# Patient Record
Sex: FEMALE | Race: WHITE | Employment: OTHER | URBAN - METROPOLITAN AREA
[De-identification: names, ages, dates, MRNs, and addresses within clinical notes are randomized per-mention and may not be internally consistent; named-entity substitution may affect disease eponyms.]

---

## 2019-09-05 ENCOUNTER — APPOINTMENT (OUTPATIENT)
Dept: PHYSICAL THERAPY | Age: 69
End: 2019-09-05

## 2019-09-12 ENCOUNTER — HOSPITAL ENCOUNTER (OUTPATIENT)
Dept: PHYSICAL THERAPY | Age: 69
Discharge: HOME | End: 2019-09-12
Payer: MEDICARE

## 2019-09-12 PROCEDURE — 97162 PT EVAL MOD COMPLEX 30 MIN: CPT

## 2019-09-12 NOTE — PROGRESS NOTES
[facility] PHYSICAL THERAPY  [address] [place] [address] 51, [facility] 201,[facility], 70 [facility] - Phone: [phone]  Fax: [phone] / [facility]  Patient Name: Florence Spears : 1950   Medical   Diagnosis: Neck Pain Treatment Diagnosis: Neck pain [M54.2]   Onset Date: 2019     Referral Source: Beny Eng MD Start of Care [facility]): 2019   Prior Hospitalization: See medical history Provider #: [id number]   Prior Level of Function: Complete ADLs, carry/lift objects, hold phone at eye level and complete cooking activities with increased efficiency   Comorbidities: Current L shoulder pain (possible RC tear), Latex Allergy, C/s corpectomy/fusion C5-C7 , R elbow debridement , Heart Cath , Depression, Osteoporosis, Recent weight gain (MD aware), Asthma, Intermittent dizziness   Medications: Verified on Patient Summary List   The Plan of Care and following information is based on the information from the initial evaluation.   ===========================================================================================  Assessment / key information:  Pt is a 78 y/o female reporting c/s pain rated 4-10/10 that started in the beginning of 2019. Pt reports moving heavy furniture at that time, but denies specific injury while completing that activity. PMHx of c/s corpectomy with fusion C5-C7 in , with a current 10 lb lifting restriction. Pain located c/s with intermittent pain/numbness L UE (shoulder to 3rd - 5th fingers) > R UE (shoulder to mid forearm). Pain increases with holding phone at eye level, carrying/lifting objects, and cooking; decreases with ice and heat. Pt also experiences clunking and popping sensation in L shoulder along with L shoulder pain. Pt reports seeing Dr. Shun Jennings for L shoulder pain with MRI indicating possible RC tear.  Pt reports MD wanted to focus on c/s first, so pt was referred to Dr. Pedro Bailey. MRI of c/s indicated surgical fixation was stable, but there is a possible issue above and below the fusion. MD ordered NCS which was scheduled, but pt cancelled. Pt reports she is going to see a neurosurgeon prior to getting a NCS. Pt reports HA occurs in the afternoon a few times a week, which pt thinks is due to allergies. Pt reports experiencing weakness in L hand since the onset of pain. Pt is L hand dominant, but writes with R hand. Pt denies red flags. Pt demonstrates poor posture (kyphosis, rounded shoulders), decreased c/s AROM (31 flex, 38 ext, 8 R lat flex, 12 L lat flex, 49 R Rot, 41 L Rot), decreased L shoulder AROM in sitting (L: 141 flex, 133 scaption, 127 ABD; R: 166 flex, 152 scaption, 170 ABD), TTP L>R UT, levator scap, c/s paraspinals, suboccipital muscles, rhomboids, post RC and LH biceps, decreased scap stability, decreased sensation to light touch L C4, C6, C7, C8 dermatomes, 4/5 L bicep/tricep strength, 4+/5 R bicep, 4/5 L tricep, decreased shoulder strength (R: 4/5 flex, ext and ER at side, 4-/5 ABD, 4+/5 IR at side; L deferred secondary to pain), decreased L  strength (45 lbs L, 60 lbs R), decreased thumb to index pinch strength L, and decreased functional mobility. FOTO Score: 56/100  ===========================================================================================  Eval Complexity: History MEDIUM  Complexity : 1-2 comorbidities / personal factors will impact the outcome/ POC ;  Examination  HIGH Complexity : 4+ Standardized tests and measures addressing body structure, function, activity limitation and / or participation in recreation ; Presentation MEDIUM Complexity : Evolving with changing characteristics ;   Decision Making MEDIUM Complexity : FOTO score of 26-74; Overall Complexity MEDIUM  Problem List: pain affecting function, decrease ROM, decrease strength, decrease ADL/ functional abilitiies, decrease activity tolerance and decrease flexibility/ joint mobility   Treatment Plan may include any combination of the following: Therapeutic exercise, Therapeutic activities, Neuromuscular re-education, Physical agent/modality, Manual therapy, Patient education, Self Care training, Functional mobility training and Home safety training  Patient / Family readiness to learn indicated by: asking questions, trying to perform skills and interest  Persons(s) to be included in education: patient (P)  Barriers to Learning/Limitations: None  Measures taken, if barriers to learning:    Patient Goal (s): \"Pain relief. Strength. \"   Patient self reported health status: good  Rehabilitation Potential: good   Short Term Goals: To be accomplished in  2  weeks:  1. Pt to demonstrate independence with HEP to improve scapular stability and c/s strength for ADLs and cooking activities. 2. Pt to report 25% or > decreased in max pain levels to improve functional mobility for ADLs and cooking activities.  Long Term Goals: To be accomplished in  4  weeks:  1. Pt to report +5 or > on GROC to improve functional mobility for cooking activities. 2. Pt to report 50% or > decrease in max pain levels to improve functional mobility for ADLs and cooking activities. 3. Pt to demonstrate 10 lb or > improvement in L  strength to improve ability to carry/lift objects with L hand. Frequency / Duration:   Patient to be seen  2-3  times per week for 4  weeks:  Patient / Caregiver education and instruction: self care and activity modification  Therapist Signature: Edward Otto PT Date: 6/01/7366   Certification Period: 9/12/19 to 12/6/19 Time: 10:00 AM   ===========================================================================================  I certify that the above Physical Therapy Services are being furnished while the patient is under my care. I agree with the treatment plan and certify that this therapy is necessary.     Physician Signature:        Date: Time:     Please sign and return to [facility] [place]. or you may fax the signed copy to [phone]. Thank you.

## 2019-09-12 NOTE — PROGRESS NOTES
PHYSICAL THERAPY - DAILY TREATMENT NOTE    Patient Name: Keena Jorgensen        Date: 2019  : 1950   yes Patient  Verified  Visit #:     Insurance: Payor: Ethyl Darlington / Plan: [place] MED[place] PART B / Product Type: Medicare /      In time: 9:00 Out time: 10:00   Total Treatment Time: 60     Medicare/BCBS Time Tracking (below)   Total Timed Codes (min):  5 1:1 Treatment Time:  60     TREATMENT AREA =  Neck pain [M54.2]    SUBJECTIVE  Pain Level (on 0 to 10 scale):  6   10   Medication Changes/New allergies or changes in medical history, any new surgeries or procedures?    no  If yes, update Summary List   Subjective Functional Status/Changes:  []  No changes reported     See initial eval          OBJECTIVE    5 min Self Care: Pt educated on ways to improve posture throughout the day. Pt educated on signs/sxs of red flags and to seek immediate medical attention/911 if those occur. Reviewed POC and goals. Pt reports understanding. (Not billed)   Rationale:    Decrease pain, improve posture to improve the patients ability to complete ADLs and cooking activites. Billed With/As:   [] TE   [] TA   [] Neuro   [x] Self Care Patient Education: [] Review HEP    [] Progressed/Changed HEP based on:   [] positioning   [] body mechanics   [] transfers   [] heat/ice application    [x] other: See above. Other Objective/Functional Measures:    See initial eval     Post Treatment Pain Level (on 0 to 10) scale:   4  / 10     ASSESSMENT  Assessment/Changes in Function:     See initial eval     []  See Progress Note/Recertification   Patient will continue to benefit from skilled PT services to see initial eval   Progress toward goals / Updated goals:    Pt denied sharp pains or red flags with initial eval. See initial eval.     PLAN  [x]  Upgrade activities as tolerated yes Continue plan of care   []  Discharge due to :    [x]  Other: PT 2-3x/week for 4 weeks.      Therapist: Ian Bauer, PT    Date: 9/12/2019 Time: 10:00 AM     Future Appointments   Date Time Provider Shannan Lewis   9/16/2019 10:30 AM Noam Park PTA [facility]   9/18/2019 10:30 AM Ascencion To, PT [facility]   9/23/2019 10:00 AM Noam Park PTA [facility]   9/25/2019  3:00 PM Ascencion To PT [facility]   9/30/2019 10:30 AM Echo Tompkins [facility]   10/3/2019 11:30 AM Noam Park PTA [facility]   10/7/2019 10:30 AM Ascencion To, PT [facility]   10/9/2019  3:00 PM Ascencion To, PT [facility]

## 2019-09-16 ENCOUNTER — HOSPITAL ENCOUNTER (OUTPATIENT)
Dept: PHYSICAL THERAPY | Age: 69
Discharge: HOME | End: 2019-09-16
Payer: MEDICARE

## 2019-09-16 PROCEDURE — 97140 MANUAL THERAPY 1/> REGIONS: CPT

## 2019-09-16 PROCEDURE — 97110 THERAPEUTIC EXERCISES: CPT

## 2019-09-16 NOTE — PROGRESS NOTES
PHYSICAL THERAPY - DAILY TREATMENT NOTE    Patient Name: Ba Pichardo        Date: 2019  : 1950   yes Patient  Verified  Visit #:     Insurance: Payor: Bishop La / Plan: [place] MEDICARE PART B / Product Type: Medicare /      In time: 7699 Out time:    Total Treatment Time: 45     Medicare/BCBS Time Tracking (below)   Total Timed Codes (min):  45 1:1 Treatment Time:  45     TREATMENT AREA =  Neck pain [M54.2]    SUBJECTIVE  Pain Level (on 0 to 10 scale):   10   Medication Changes/New allergies or changes in medical history, any new surgeries or procedures?    no  If yes, update Summary List   Subjective Functional Status/Changes:  []  No changes reported     No arm pain but just a lot of deep ache on the left side of my neck. OBJECTIVE    30 min Therapeutic Exercise:  [x]  See flow sheet   Rationale:      increase ROM and increase strength to improve the patients ability to perform general ADLs with decrease c/o symptoms. 15 min Manual Therapy: STM (B) UT and mid T/S, gentle ROM. Rationale:      decrease pain, increase ROM and increase tissue extensibility to improve patient's ability to perform general ADLs with decrease c/o symptoms. Billed With/As:   [x] TE   [] TA   [] Neuro   [] Self Care Patient Education: [x] Review HEP    [] Progressed/Changed HEP based on:   [] positioning   [] body mechanics   [] transfers   [] heat/ice application    [] other:      Other Objective/Functional Measures:    No change in functional measurements today. Instructed patient on sitting slouch/correct technique which she was able to return demonstration. Patient instructed to perform throughout the day 5 x 5 seconds.      Post Treatment Pain Level (on 0 to 10) scale:   1  / 10     ASSESSMENT  Assessment/Changes in Function:     Overall good tolerance to all therapeutic interventions for first treatment session     []  See Progress Note/Recertification   Patient will continue to benefit from skilled PT services to modify and progress therapeutic interventions, address ROM deficits, address strength deficits, analyze and address soft tissue restrictions and analyze and cue movement patterns to attain remaining goals. Progress toward goals / Updated goals: · Short Term Goals: To be accomplished in  2  weeks:  1. Pt to demonstrate independence with HEP to improve scapular stability and c/s strength for ADLs and cooking activities. 2. Pt to report 25% or > decreased in max pain levels to improve functional mobility for ADLs and cooking activities. · Long Term Goals: To be accomplished in  4  weeks:  1. Pt to report +5 or > on GROC to improve functional mobility for cooking activities. 2. Pt to report 50% or > decrease in max pain levels to improve functional mobility for ADLs and cooking activities. 3. Pt to demonstrate 10 lb or > improvement in L  strength to improve ability to carry/lift objects with L hand.     No change toward goals today,     PLAN  []  Upgrade activities as tolerated yes Continue plan of care   []  Discharge due to :    []  Other:      Therapist: Wendy Tatum PTA    Date: 9/16/2019 Time: 6:40 AM     Future Appointments   Date Time Provider Shnanan Lewis   9/16/2019 10:30 AM Harley Fair, GENNY [facility]   9/18/2019 10:30 AM Cheryle Jones PT [facility]   9/23/2019 10:00 AM Harley Fair, GENNY [facility]   9/25/2019  3:00 PM Cheryle Jones PT [facility]   9/30/2019 10:30 AM Harley Bath, GENNY [facility]   10/3/2019 11:30 AM Harley Fair, GENNY [facility]   10/7/2019 10:30 AM Cheryle Jones PT [facility]   10/9/2019  3:00 PM Cheryle Jones, PT [facility]

## 2019-09-18 ENCOUNTER — HOSPITAL ENCOUNTER (OUTPATIENT)
Dept: PHYSICAL THERAPY | Age: 69
Discharge: HOME | End: 2019-09-18
Payer: MEDICARE

## 2019-09-18 PROCEDURE — 97140 MANUAL THERAPY 1/> REGIONS: CPT

## 2019-09-18 NOTE — PROGRESS NOTES
PHYSICAL THERAPY - DAILY TREATMENT NOTE    Patient Name: eMlissa Soto        Date: 2019  : 1950   yes Patient  Verified  Visit #:   3   of   12  Insurance: Payor: Elijah Cluster / Plan: [place] MEDICARE PART B / Product Type: Medicare /      In time: 10:29 Out time: 11:10   Total Treatment Time: 41     Medicare/BCBS Time Tracking (below)   Total Timed Codes (min):  41 1:1 Treatment Time:  25     TREATMENT AREA =  Neck pain [M54.2]    SUBJECTIVE  Pain Level (on 0 to 10 scale):  3  / 10   Medication Changes/New allergies or changes in medical history, any new surgeries or procedures?    no  If yes, update Summary List   Subjective Functional Status/Changes:  []  No changes reported     Pt reports some soreness the day after last PT session. Pt reports she has been trying to complete the exercises she did in PT last time, but needs a copy of the exercises to make sure she is completing them correctly. Pt denies falls or red flags. Pt reports she will be seeing the neurosurgeon on Oct 1st.          OBJECTIVE    16 min Therapeutic Exercise:  [x]  See flow sheet (Not billed)   Rationale:      increase strength to improve the patients ability to complete ADLs and cooking activities. 25 min Manual Therapy: STM to B UT, levator scap, c/s paraspinals and suboccipital muscles in supine. (Billed 25 minutes)   Rationale:      decrease pain and decrease trigger points to improve patient's ability to complete ADLs and cooking activities. Billed With/As:   [x] TE   [] TA   [] Neuro   [] Self Care Patient Education: [x] Review HEP    [] Progressed/Changed HEP based on:   [] positioning   [] body mechanics   [] transfers   [] heat/ice application    [x] other: Pt instructed on and given HEP to be completed daily. Pt reports and demonstrates understanding.      Other Objective/Functional Measures:    TTP L>R UT     Post Treatment Pain Level (on 0 to 10) scale:    10     ASSESSMENT  Assessment/Changes in Function: Pt denied sharp pains or red flags with therapeutic ex. Pt reported an improvement in pain/sxs at end of session. []  See Progress Note/Recertification   Patient will continue to benefit from skilled PT services to modify and progress therapeutic interventions, address functional mobility deficits, address strength deficits, analyze and address soft tissue restrictions and analyze and cue movement patterns to attain remaining goals. Progress toward goals / Updated goals:    Pt instructed on HEP and reports/demo understanding - progressing towards STG #1.      PLAN  [x]  Upgrade activities as tolerated yes Continue plan of care   []  Discharge due to :    []  Other:      Therapist: Blanche Carbone, PT    Date: 9/18/2019 Time: 10:40 AM     Future Appointments   Date Time Provider Shannan Lewis   9/23/2019 10:00 AM Gemma Lot, PTA [address] [place] [address]   9/25/2019  3:00 PM Radha Marin,  [facility]   9/30/2019 10:30 AM Gemma Lot, PTA [address] [place] [address]   10/3/2019 11:30 AM Gemma Lot, PTA [address] [place] [address]   10/7/2019 10:30 AM Radha Marin,  [facility]   10/9/2019  3:00 PM Radha Marin PT [address] [place] [address]

## 2019-09-23 ENCOUNTER — HOSPITAL ENCOUNTER (OUTPATIENT)
Dept: PHYSICAL THERAPY | Age: 69
Discharge: HOME | End: 2019-09-23
Payer: MEDICARE

## 2019-09-23 PROCEDURE — 97140 MANUAL THERAPY 1/> REGIONS: CPT

## 2019-09-23 PROCEDURE — 97110 THERAPEUTIC EXERCISES: CPT

## 2019-09-23 NOTE — PROGRESS NOTES
PHYSICAL THERAPY - DAILY TREATMENT NOTE    Patient Name: Melissa Soto        Date: 2019  : 1950   yes Patient  Verified  Visit #:     Insurance: Payor: Elijah Cluster / Plan: [place] MEDICARE PART B / Product Type: Medicare /      In time:  Out time:    Total Treatment Time: 35     Medicare/BCBS Time Tracking (below)   Total Timed Codes (min):  35 1:1 Treatment Time:  35     TREATMENT AREA =  Neck pain [M54.2]    SUBJECTIVE  Pain Level (on 0 to 10 scale):  2  / 10   Medication Changes/New allergies or changes in medical history, any new surgeries or procedures?    no  If yes, update Summary List   Subjective Functional Status/Changes:  []  No changes reported     i'm seeing my neurosurgeon on the . But the neck seems a little better. I did feel a lack of strength in my arm over the weekend when holding onto objects. Reports 1 or 2 times of radicular pain in (L) UE.          OBJECTIVE    20 min Therapeutic Exercise:  [x]  See flow sheet   Rationale:      increase ROM and increase strength to improve the patients ability to  perform general ADLs with decrease c/o symptoms. 15 min Manual Therapy: STM (B) UT and mid T/S, gentle ROM. Rationale:      decrease pain, increase ROM and increase tissue extensibility to improve patient's ability to  perform general ADLs with decrease c/o symptoms. Billed With/As:   [x] TE   [] TA   [] Neuro   [] Self Care Patient Education: [x] Review HEP    [] Progressed/Changed HEP based on:   [] positioning   [] body mechanics   [] transfers   [] heat/ice application    [] other:      Other Objective/Functional Measures:    Continue with threx per flow sheet,  Decrease TTP along C/s musculature. Post Treatment Pain Level (on 0 to 10) scale:   0  / 10     ASSESSMENT  Assessment/Changes in Function:     Patient reporting overall decrease of symptoms and pain with performance of all general ADLs.   Patient also reporting that she practices her HEP and slouch/correct sitting posture. []  See Progress Note/Recertification   Patient will continue to benefit from skilled PT services to modify and progress therapeutic interventions, address ROM deficits, address strength deficits, analyze and address soft tissue restrictions and analyze and cue movement patterns to attain remaining goals. Progress toward goals / Updated goals: · Short Term Goals: To be accomplished in  2  weeks:  1. Pt to demonstrate independence with HEP to improve scapular stability and c/s strength for ADLs and cooking activities. Achieved: 9/23/192. Pt to report 25% or > decreased in max pain levels to improve functional mobility for ADLs and cooking activities. Progressing steadily 9/23/19  · Long Term Goals: To be accomplished in  4  weeks:  1. Pt to report +5 or > on GROC to improve functional mobility for cooking activities. 2. Pt to report 50% or > decrease in max pain levels to improve functional mobility for ADLs and cooking activities. 3. Pt to demonstrate 10 lb or > improvement in L  strength to improve ability to carry/lift objects with L hand.        PLAN  []  Upgrade activities as tolerated yes Continue plan of care   []  Discharge due to :    []  Other:      Therapist: Thanh Leon PTA    Date: 9/23/2019 Time: 6:26 AM     Future Appointments   Date Time Provider Shannan Lewis   9/23/2019 10:00 AM Yanna Schwartz PTA [facility]   9/25/2019  3:00 PM Aren Torres, PT [facility]   9/30/2019 10:30 AM Yanna Schwartz PTA [facility]   10/3/2019 11:30 AM Yanna Schwartz PTA [facility]   10/7/2019 10:30 AM Aren Torres PT [facility]   10/9/2019  3:00 PM Aren Torres, PT [facility]

## 2019-09-25 ENCOUNTER — HOSPITAL ENCOUNTER (OUTPATIENT)
Dept: PHYSICAL THERAPY | Age: 69
Discharge: HOME | End: 2019-09-25
Payer: MEDICARE

## 2019-09-25 PROCEDURE — 97140 MANUAL THERAPY 1/> REGIONS: CPT

## 2019-09-25 PROCEDURE — 97110 THERAPEUTIC EXERCISES: CPT

## 2019-09-25 NOTE — PROGRESS NOTES
PHYSICAL THERAPY - DAILY TREATMENT NOTE    Patient Name: Kylie Check        Date: 2019  : 1950   yes Patient  Verified  Visit #:      of   12  Insurance: Payor: Yareli Vale / Plan: [place] MEDICARE PART B / Product Type: Medicare /        In time: 2:55 PM Out time: 3:25 PM   Total Treatment Time: 30     Medicare/BCBS Time Tracking (below)   Total Timed Codes (min):  30 1:1 Treatment Time:  30     TREATMENT AREA =  Neck pain [M54.2]    SUBJECTIVE  Pain Level (on 0 to 10 scale):  3 / 10   Medication Changes/New allergies or changes in medical history, any new surgeries or procedures?    no  If yes, update Summary List   Subjective Functional Status/Changes:  []  No changes reported     Patient reports having some discomfort on the R side of the neck the following day after LV. OBJECTIVE    15 min Therapeutic Exercise:  [x]  See flow sheet   Rationale:      increase ROM and increase strength to improve the patients ability to  perform general ADLs with decrease c/o symptoms. 15 min Manual Therapy: STM/MFR to B c/s paraspinals and B UT/lev scap    Rationale:      decrease pain, increase ROM and increase tissue extensibility to improve patient's ability to  perform general ADLs with decrease c/o symptoms. Billed With/As:   [x] TE   [] TA   [] Neuro   [] Self Care Patient Education: [x] Review HEP    [] Progressed/Changed HEP based on:   [] positioning   [] body mechanics   [] transfers   [] heat/ice application    [] other:      Other Objective/Functional Measures:    1:1 TE = 15'    No significant muscle restrictions or tenderness during manual.   Increase repetitions with gripper in R hand. Post Treatment Pain Level (on 0 to 10) scale:  0  / 10     ASSESSMENT  Assessment/Changes in Function:     Demonstrated good tolerance with today's PT interventions indicated by 0/10 pain level post tx session. Will continue to progress therex per pt's tolerance and POC.       []  See Progress Note/Recertification   Patient will continue to benefit from skilled PT services to modify and progress therapeutic interventions, address ROM deficits, address strength deficits, analyze and address soft tissue restrictions and analyze and cue movement patterns to attain remaining goals. Progress toward goals / Updated goals: · Short Term Goals: To be accomplished in  2  weeks:  1. Pt to demonstrate independence with HEP to improve scapular stability and c/s strength for ADLs and cooking activities. Achieved: 9/23/192. Pt to report 25% or > decreased in max pain levels to improve functional mobility for ADLs and cooking activities. Progressing steadily 9/23/19  · Long Term Goals: To be accomplished in  4  weeks:  1. Pt to report +5 or > on GROC to improve functional mobility for cooking activities. Reassess NV  2. Pt to report 50% or > decrease in max pain levels to improve functional mobility for ADLs and cooking activities. Progressing 09/25; indicated by reduced pre vs post pain levels   3. Pt to demonstrate 10 lb or > improvement in L  strength to improve ability to carry/lift objects with L hand.        PLAN  [x]  Upgrade activities as tolerated yes Continue plan of care   []  Discharge due to :    []  Other:      Therapist: JASMIN Brown    Date: 9/25/2019 Time: 4:38 PM     Future Appointments   Date Time Provider Shannan Lewis   9[facility]25[facility]2019  3[facility]   9/30/2019 10:30 AM Sonido Guzmán PTA [facility]   10/3/2019 11:30 AM Sonido Guzmán PTA [facility]   10/7/2019 10:30 AM John Main PT [facility]   10/9/2019  3:00 PM John Main PT [facility]

## 2019-09-30 ENCOUNTER — HOSPITAL ENCOUNTER (OUTPATIENT)
Dept: PHYSICAL THERAPY | Age: 69
Discharge: HOME | End: 2019-09-30
Payer: MEDICARE

## 2019-09-30 PROCEDURE — 97140 MANUAL THERAPY 1/> REGIONS: CPT

## 2019-09-30 PROCEDURE — 97110 THERAPEUTIC EXERCISES: CPT

## 2019-09-30 NOTE — PROGRESS NOTES
PHYSICAL THERAPY - DAILY TREATMENT NOTE    Patient Name: Sindi Rivera        Date: 2019  : 1950   yes Patient  Verified  Visit #:     Insurance: Payor: Micheal Soliz / Plan: [place] MED[place]ARE PART B / Product Type: Medicare /      In time: 6097 Out time: 1100   Total Treatment Time: 40     Medicare/BCBS Time Tracking (below)   Total Timed Codes (min):  40 1:1 Treatment Time:  40     TREATMENT AREA =  Neck pain [M54.2]    SUBJECTIVE  Pain Level (on 0 to 10 scale):  6  / 10   Medication Changes/New allergies or changes in medical history, any new surgeries or procedures?    no  If yes, update Summary List   Subjective Functional Status/Changes:  []  No changes reported     I did a lot of fall cleaning over the weekend so I think that may be contributing to the deep ache in my arm. OBJECTIVE    25 min Therapeutic Exercise:  [x]  See flow sheet   Rationale:      increase ROM and increase strength to improve the patients ability to perform general ADLs with decrease c/o symptoms. 15 min Manual Therapy: STM (B) UT and mid T/S, gentle ROM. Rationale:      decrease pain, increase ROM and increase tissue extensibility to improve patient's ability to perform general ADLs with decrease c/o symptoms. Billed With/As:   [x] TE   [] TA   [] Neuro   [] Self Care Patient Education: [x] Review HEP    [] Progressed/Changed HEP based on:   [] positioning   [] body mechanics   [] transfers   [] heat/ice application    [] other:      Other Objective/Functional Measures:    foto 59 vs 56 on IE: 3 point improvement  GROC: 4  Moderately better. Post Treatment Pain Level (on 0 to 10) scale:   0  / 10     ASSESSMENT  Assessment/Changes in Function:     Continue with therx per flow sheet.      []  See Progress Note/Recertification   Patient will continue to benefit from skilled PT services to modify and progress therapeutic interventions, address ROM deficits, address strength deficits, analyze and address soft tissue restrictions and analyze and cue movement patterns to attain remaining goals. Progress toward goals / Updated goals: · Short Term Goals: To be accomplished in  2  weeks:  1. Pt to demonstrate independence with HEP to improve scapular stability and c/s strength for ADLs and cooking activities. Achieved: 9/23/192. Pt to report 25% or > decreased in max pain levels to improve functional mobility for ADLs and cooking activities. Progressing steadily 9/23/19  · Long Term Goals: To be accomplished in  4  weeks:  1. Pt to report +5 or > on GROC to improve functional mobility for cooking activities. progressing well: 9/30/19  2. Pt to report 50% or > decrease in max pain levels to improve functional mobility for ADLs and cooking activities. Progressing 09/25; indicated by reduced pre vs post pain levels   3.  Pt to demonstrate 10 lb or > improvement in L  strength to improve ability to carry/lift objects with L hand.        PLAN  []  Upgrade activities as tolerated yes Continue plan of care   []  Discharge due to :    []  Other:      Therapist: Mario De La Rosa PTA    Date: 9/30/2019 Time: 6:25 AM     Future Appointments   Date Time Provider Shannan Lewis   9/30/2019 10:30 AM Kristine Arreola [facility]   10/3/2019 11:30 AM Preston Villagomez PTA [facility]   10/7/2019 10:30 AM Bevely Rubinstein, PT [facility]   10/9/2019  3:00 PM Bevely Rubinstein, PT [facility]

## 2019-10-03 ENCOUNTER — HOSPITAL ENCOUNTER (OUTPATIENT)
Dept: PHYSICAL THERAPY | Age: 69
Discharge: HOME | End: 2019-10-03
Payer: MEDICARE

## 2019-10-03 PROCEDURE — 97140 MANUAL THERAPY 1/> REGIONS: CPT

## 2019-10-03 PROCEDURE — 97110 THERAPEUTIC EXERCISES: CPT

## 2019-10-03 NOTE — PROGRESS NOTES
PHYSICAL THERAPY - DAILY TREATMENT NOTE    Patient Name: Bola Vaca        Date: 10/3/2019  : 1950   yes Patient  Verified  Visit #:     Insurance: Payor: Jolene Feeler / Plan: [place] MEDICARE PART B / Product Type: Medicare /      In time: 82 Out time: 2770   Total Treatment Time: 40     Medicare/BCBS Time Tracking (below)   Total Timed Codes (min):  40 1:1 Treatment Time:  30     TREATMENT AREA =  Neck pain [M54.2]    SUBJECTIVE  Pain Level (on 0 to 10 scale):  3  / 10   Medication Changes/New allergies or changes in medical history, any new surgeries or procedures?    no  If yes, update Summary List   Subjective Functional Status/Changes:  []  No changes reported     Patient reports having an MRI performed and brought in the results with her. Reports that her MD wants her to have neck therapy. OBJECTIVE    25 min Therapeutic Exercise:  [x]  See flow sheet   Rationale:      increase ROM and increase strength to improve the patients ability to perform general ADLs with decrease c/o symptoms.          15 min Manual Therapy: STM (B) UT and mid T/S, gentle ROM. Rationale:      decrease pain, increase ROM and increase tissue extensibility to improve patient's ability to perform general ADLs with decrease c/o symptoms.         Billed With/As:   [x] TE   [] TA   [] Neuro   [] Self Care Patient Education: [x] Review HEP    [] Progressed/Changed HEP based on:   [] positioning   [] body mechanics   [] transfers   [] heat/ice application    [] other:      Other Objective/Functional Measures:    Patient comes to therapy with a significant amount of information regarding her entire spine. Patient's neurologist is Dr. Ирина Waldron. Patient states that the doctor feels her arm symptoms are coming from her neck and not her shoulder. Patient reports that she has been in more pain since her MRIs were performed secondary to the positions she needed to be in.      Post Treatment Pain Level (on 0 to 10) scale:   0  / 10     ASSESSMENT  Assessment/Changes in Function:     Continue with therx per flow sheet. Advised patient to set neck appointment with PT Luanne Yousif. []  See Progress Note/Recertification   Patient will continue to benefit from skilled PT services to modify and progress therapeutic interventions, address functional mobility deficits, address ROM deficits, address strength deficits, analyze and address soft tissue restrictions and analyze and cue movement patterns to attain remaining goals. Progress toward goals / Updated goals: · Short Term Goals: To be accomplished in  2  weeks:  1. Pt to demonstrate independence with HEP to improve scapular stability and c/s strength for ADLs and cooking activities. Achieved: 9/23/192. Pt to report 25% or > decreased in max pain levels to improve functional mobility for ADLs and cooking activities. Progressing steadily 9/23/19  · Long Term Goals: To be accomplished in  4  weeks:  1. Pt to report +5 or > on GROC to improve functional mobility for cooking activities. progressing well: 9/30/19  2. Pt to report 50% or > decrease in max pain levels to improve functional mobility for ADLs and cooking activities. Progressing 09/25; indicated by reduced pre vs post pain levels   3. Pt to demonstrate 10 lb or > improvement in L  strength to improve ability to carry/lift objects with L hand.          PLAN  []  Upgrade activities as tolerated yes Continue plan of care   []  Discharge due to :    []  Other:      Therapist: Rachna Raza PTA    Date: 10/3/2019 Time: 6:08 AM     Future Appointments   Date Time Provider Shannan Lewis   10/3/2019 11:30 AM Hugo Mccormick [facility]   10/7/2019 10:30 AM Elroy Salamanca PT [facility]   10/9/2019  3:00 PM Elroy Salamanca PT [facility]

## 2019-10-07 ENCOUNTER — HOSPITAL ENCOUNTER (OUTPATIENT)
Dept: PHYSICAL THERAPY | Age: 69
Discharge: HOME | End: 2019-10-07
Payer: MEDICARE

## 2019-10-07 PROCEDURE — 97110 THERAPEUTIC EXERCISES: CPT

## 2019-10-07 PROCEDURE — 97140 MANUAL THERAPY 1/> REGIONS: CPT

## 2019-10-07 NOTE — PROGRESS NOTES
[address] [place] [address] PHYSICAL THERAPY   [facility], [address],[place], [address] [facility] - Phone: [phone]  Fax: [phone] PHYSICAL THERAPY          Patient Name: Zachery Pearson : 1950   Treatment/Medical Diagnosis: Neck pain [M54.2]   Onset Date: 2019    Referral Source: BI Rueda MD, MD Start of Care [facility]): 19   Prior Hospitalization: See Medical History Provider #: [id number]   Prior Level of Function: Complete ADLs, carry/lift objects, hold phone at eye level and complete cooking activities with increased efficiency   Comorbidities: Current L shoulder pain (possible RC tear), Latex allergy, C/s corpectomy/fusion C5-C7 , R elbow debridement , Heart Cath , Depression, Osteoporosis, Recent weight gain (MD aware), Asthma, Intermittent Dizziness   Medications: Verified on Patient Summary List   Visits from [facility]: 8 Missed Visits: 0     Goal/Measure of Progress Goal Met? 1.  Pt to report +5 or > on GROC to improve functional mobility for cooking activities. Status at last Eval: N/A Current Status: +4 Progressing   2. Pt to report 50% or > decrease in max pain levels to improve functional mobility for ADLs and cooking activities. Status at last Eval: 1010 Current Status: 9/10 Slow progress   3. Pt to demonstrate 10 lb or > improvement in L  strength to improve ability to carry/lift objects with L hand. Status at last Eval: 45 lbs Current Status: 50 lbs Progressing     Key Functional Changes/Progress: Pt reports 4/10 avg pain levels, 9/10 max pain levels, +4 on GROC, 59/100 FOTO scores (3 point improvement since initial eval) and compliance with HEP. Pt demonstrates improvements in L  strength with current measurements list above. Pt's therapeutic ex has been limited due to MD request and current shoulder issues.  Pt reports seeing neurosurgeon recently and was instructed to continue with PT. Pt reports significant improvements in pain/sxs after PT sessions, but improvement is only temporary. Problem List: pain affecting function, decrease ROM, decrease strength, decrease ADL/ functional abilitiies, decrease activity tolerance and decrease transfer abilities   Treatment Plan may include any combination of the following: Therapeutic exercise, Therapeutic activities, Neuromuscular re-education, Physical agent/modality, Manual therapy, Patient education, Self Care training, Functional mobility training and Home safety training  Patient Goal(s) has been updated and includes:      Goals for this certification period include and are to be achieved in   3-4  weeks:  1. Pt to report +5 or > on GROC to improve functional mobility for cooking activities. 2. Pt to report 50% or > decrease in max pain levels to improve functional mobility for ADLs and cooking activities. 3. Pt to demonstrate 10 lb or > improvement in L  strength (since initial eval) to improve ability to carry/lift objects with L hand. Frequency / Duration:   Patient to be seen   1-2   times per week for   3-4    weeks:  Assessments/Recommendations: Recommend patient continue with PT to further improve upon c/s and UE strength, pain levels and functional mobility. Please advise. Thank you. If you have any questions/comments please contact us directly at [phone]. Thank you for allowing us to assist in the care of your patient.     Therapist Signature: Remi Apgar, PT Date: 3783   Certification Period:  Reporting Period: 19 to 19 to 10/7/19 Time: 12:37 PM   NOTE TO PHYSICIAN:  [address] AND FAX TO   [facility] Physical Therapy: [phone]  If you are unable to process this request in 24 hours please contact our office: [phone]    ___ I have read the above report and request that my patient continue as recommended.   ___ I have read the above report and request that my patient continue therapy with the following changes/special instructions: ________________________________________________   ___ I have read the above report and request that my patient be discharged from therapy.      Physician Signature:        Date:       Time:

## 2019-10-07 NOTE — PROGRESS NOTES
PHYSICAL THERAPY - DAILY TREATMENT NOTE    Patient Name: Justus Wilder        Date: 10/7/2019  : 1950   yes Patient  Verified  Visit #:     Insurance: Payor: Danae Ramirez / Plan: [place] MEDICARE PART B / Product Type: Medicare /      In time: 10:30 Out time: 11:28   Total Treatment Time: 58     Medicare/BCBS Time Tracking (below)   Total Timed Codes (min):  58 1:1 Treatment Time:  45     TREATMENT AREA =  Neck pain [M54.2]    SUBJECTIVE  Pain Level (on 0 to 10 scale):  4  / 10   Medication Changes/New allergies or changes in medical history, any new surgeries or procedures?    no  If yes, update Summary List   Subjective Functional Status/Changes:  []  No changes reported     Pt reports having NCS and there is nerve damage and atrophy in L shoulder. Pt reports MD wants her to continue with PT for her neck. Pt denies falls or red flags. Pt reports compliance with HEP. Pt reports reaching behind her back with L arm to clasp bra over the weekend with pain in L shoulder. Avg pain level: 4/10, Max pain level: 9/10. Pt reports she experiences significant improvements in pain levels after PT sessions, but this is only temporary. OBJECTIVE    33 min Therapeutic Exercise:  [x]  See flow sheet (Billed 20 minutes)   Rationale:      increase strength to improve the patients ability to complete ADLs and cooking activities. 25 min Manual Therapy: STM to B UT, levator scap, c/s paraspinals and suboccipital muscles in supine. (Billed 25 minutes)   Rationale:      decrease pain and decrease trigger points to improve patient's ability to complete ADLs and cooking activities. Billed With/As:   [x] TE   [] TA   [] Neuro   [] Self Care Patient Education: [x] Review HEP    [] Progressed/Changed HEP based on:   [] positioning   [] body mechanics   [] transfers   [] heat/ice application    [x] other: Reviewed importance of posture and body mechanics. Pt reports understanding.      Other Objective/Functional Measures:     strength: 50 lbs L, 65 lbs R   Post Treatment Pain Level (on 0 to 10) scale:   0  / 10     ASSESSMENT  Assessment/Changes in Function:     Pt denied sharp pains or red flags with therapeutic ex. Pt denied pain at end of session. See Catalina cassidyrt. [x]  See Progress Note/Recertification   Patient will continue to benefit from skilled PT services to modify and progress therapeutic interventions, address functional mobility deficits, address strength deficits, analyze and address soft tissue restrictions and analyze and cue movement patterns to attain remaining goals. Progress toward goals / Updated goals:    See Catalina cassidyrt.      PLAN  [x]  Upgrade activities as tolerated yes Continue plan of care   []  Discharge due to :    [x]  Other: Send [address] recert to MD.     Therapist: Ambrosio De Luna PT    Date: 10/7/2019 Time: 11:20 AM     Future Appointments   Date Time Provider Shannan Lewis   10/9/2019  3:00 PM Petrona Moore, PT [address]   10/11/2019  8:30 AM Fallon Breen PTA [address]

## 2019-10-09 ENCOUNTER — HOSPITAL ENCOUNTER (OUTPATIENT)
Dept: PHYSICAL THERAPY | Age: 69
Discharge: HOME | End: 2019-10-09
Payer: MEDICARE

## 2019-10-09 PROCEDURE — 97110 THERAPEUTIC EXERCISES: CPT

## 2019-10-09 PROCEDURE — 97140 MANUAL THERAPY 1/> REGIONS: CPT

## 2019-10-09 NOTE — PROGRESS NOTES
PHYSICAL THERAPY - DAILY TREATMENT NOTE    Patient Name: Taqueria Park        Date: 10/9/2019  : 1950   yes Patient  Verified  Visit #:     Insurance: Payor: Robert Snare / Plan: [place] MEDICARE PART B / Product Type: Medicare /      In time: 200 Out time: 240   Total Treatment Time: 40     Medicare/[facility] Time Tracking (below)   Total Timed Codes (min):  40 1:1 Treatment Time:  40     TREATMENT AREA =  Neck pain [M54.2]    SUBJECTIVE  Pain Level (on 0 to 10 scale):  4  / 10   Medication Changes/New allergies or changes in medical history, any new surgeries or procedures?    no  If yes, update Summary List   Subjective Functional Status/Changes:  []  No changes reported     I had a 6/10 pain level this morning, but i'm ok right now. OBJECTIVE    25 min Therapeutic Exercise:  [x]  See flow sheet   Rationale:      increase ROM, increase strength and improve coordination to improve the patients ability to complete ADLs and cooking activities.      15 min Manual Therapy: STM to B UT, levator scap, c/s paraspinals and suboccipital muscles in supine. Rationale:      decrease pain, increase ROM and increase tissue extensibility to improve patient's ability to complete ADLs and cooking activities. Billed With/As:   [x] TE   [] TA   [] Neuro   [] Self Care Patient Education: [x] Review HEP    [] Progressed/Changed HEP based on:   [] positioning   [] body mechanics   [] transfers   [] heat/ice application    [] other:      Other Objective/Functional Measures:    Progressing steadily with reduction of TP and muscle tension throughout C/S paraspinals and surrounding neck muscles: improvement noted with AROM and reduction of pain. Post Treatment Pain Level (on 0 to 10) scale:   0  / 10     ASSESSMENT  Assessment/Changes in Function:     Patient reporting slight improvement with carryover of pain reduction while performing general ADLs recently.      []  See Progress Note/Recertification Patient will continue to benefit from skilled PT services to modify and progress therapeutic interventions, address ROM deficits, address strength deficits, analyze and address soft tissue restrictions and analyze and cue movement patterns to attain remaining goals. Progress toward goals / Updated goals:    1. Pt to report +5 or > on GROC to improve functional mobility for cooking activities. 2. Pt to report 50% or > decrease in max pain levels to improve functional mobility for ADLs and cooking activities. 3. Pt to demonstrate 10 lb or > improvement in L  strength (since initial eval) to improve ability to carry/lift objects with L hand.     No change toward new goals     PLAN  []  Upgrade activities as tolerated yes Continue plan of care   []  Discharge due to :    []  Other:      Therapist: Pretty Pandey PTA    Date: 10/9/2019 Time: 2:08 PM     Future Appointments   Date Time Provider Shannan Lewis   10/11/2019  8:30 AM Valente Killian PTA [facility]

## 2019-10-11 ENCOUNTER — APPOINTMENT (OUTPATIENT)
Dept: PHYSICAL THERAPY | Age: 69
End: 2019-10-11
Payer: MEDICARE

## 2019-10-14 ENCOUNTER — HOSPITAL ENCOUNTER (OUTPATIENT)
Dept: PHYSICAL THERAPY | Age: 69
End: 2019-10-14
Payer: MEDICARE

## 2019-10-18 ENCOUNTER — HOSPITAL ENCOUNTER (OUTPATIENT)
Dept: PHYSICAL THERAPY | Age: 69
Discharge: HOME | End: 2019-10-18
Payer: MEDICARE

## 2019-10-18 PROCEDURE — 97110 THERAPEUTIC EXERCISES: CPT

## 2019-10-18 PROCEDURE — 97140 MANUAL THERAPY 1/> REGIONS: CPT

## 2019-10-18 NOTE — PROGRESS NOTES
PHYSICAL THERAPY - DAILY TREATMENT NOTE    Patient Name: Justus Wilder        Date: 10/18/2019  : 1950   yes Patient  Verified  Visit #:   10   of   16  Insurance: Payor: Danae Ramirez / Plan: [place] MEDICARE PART B / Product Type: Medicare /      In time: 900 Out time: 930   Total Treatment Time: 30     Medicare/BCBS Time Tracking (below)   Total Timed Codes (min):  30 1:1 Treatment Time:  30     TREATMENT AREA =  Neck pain [M54.2]    SUBJECTIVE  Pain Level (on 0 to 10 scale):  4  / 10   Medication Changes/New allergies or changes in medical history, any new surgeries or procedures?    no  If yes, update Summary List   Subjective Functional Status/Changes:  []  No changes reported     Had my MRI done and that took a long time. I had a few days that were pretty rough though. OBJECTIVE    15 min Therapeutic Exercise:  [x]  See flow sheet   Rationale:      increase ROM, increase strength and improve coordination to improve the patients ability to complete ADLs and cooking activities. 15 min Manual Therapy: STM to B UT, levator scap, c/s paraspinals and suboccipital muscles in supine. Rationale:      decrease pain, increase ROM and increase tissue extensibility to improve patient's ability to complete ADLs and cooking activities. Billed With/As:   [x] TE   [] TA   [] Neuro   [] Self Care Patient Education: [x] Review HEP    [] Progressed/Changed HEP based on:   [] positioning   [] body mechanics   [] transfers   [] heat/ice application    [] other:      Other Objective/Functional Measures:    improved PROM of C/S rotation and side bending during manual treatment, less restrictions noted. Post Treatment Pain Level (on 0 to 10) scale:   2  / 10     ASSESSMENT  Assessment/Changes in Function:     Patient reports that she was able to sit in a movie for a a longer time with less symptoms.      []  See Progress Note/Recertification   Patient will continue to benefit from skilled PT services to modify and progress therapeutic interventions, address ROM deficits, address strength deficits, analyze and address soft tissue restrictions and analyze and cue movement patterns to attain remaining goals. Progress toward goals / Updated goals:    1. Pt to report +5 or > on GROC to improve functional mobility for cooking activities. 2. Pt to report 50% or > decrease in max pain levels to improve functional mobility for ADLs and cooking activities. 3. Pt to demonstrate 10 lb or > improvement in L  strength (since initial eval) to improve ability to carry/lift objects with L hand.        PLAN  []  Upgrade activities as tolerated yes Continue plan of care   []  Discharge due to :    []  Other:      Therapist: Unknown GENNY Harrison    Date: 10/18/2019 Time: 5:55 AM     Future Appointments   Date Time Provider Shannan Lewis   10/18/2019  9:00 AM Edilson Johnson PTA [facility]   10/21/2019  8:00 AM Edilson Johnson [facility]   10/25/2019  9:30 AM Edilson Johnson PTA [facility]   10/28/2019  9:00 AM Edilson Johnson PTA [facility]   10/30/2019  3:00 PM Edson Kennedy, PT [facility]   11/4/2019  9:30 AM Edilson Johnson PTA [facility]   11/8/2019  9:30 AM Edilson Johnson PTA [facility]   11/11/2019  9:30 AM Eidlson Johnson PTA [facility]   11/13/2019 10:30 AM Edson Kennedy, PT [facility]   11/18/2019  9:30 AM Uma Terry, [facility]

## 2019-10-21 ENCOUNTER — HOSPITAL ENCOUNTER (OUTPATIENT)
Dept: PHYSICAL THERAPY | Age: 69
Discharge: HOME | End: 2019-10-21
Payer: MEDICARE

## 2019-10-21 PROCEDURE — 97110 THERAPEUTIC EXERCISES: CPT

## 2019-10-21 PROCEDURE — 97140 MANUAL THERAPY 1/> REGIONS: CPT

## 2019-10-21 NOTE — PROGRESS NOTES
PHYSICAL THERAPY - DAILY TREATMENT NOTE    Patient Name: Varsha Andersen        Date: 10/21/2019  : 1950   yes Patient  Verified  Visit #:     Insurance: Payor: Lester Luo / Plan: [place] MED[place]ARE PART B / Product Type: Medicare /      In time: 750 Out time: 830   Total Treatment Time: 40     Medicare/[facility] Time Tracking (below)   Total Timed Codes (min):  40 1:1 Treatment Time:  40     TREATMENT AREA =  Neck pain [M54.2]    SUBJECTIVE  Pain Level (on 0 to 10 scale):  5  / 10   Medication Changes/New allergies or changes in medical history, any new surgeries or procedures?    no  If yes, update Summary List   Subjective Functional Status/Changes:  []  No changes reported     Had a little bit a radicular symptoms down to my elbow last night. OBJECTIVE    25 min Therapeutic Exercise:  [x]  See flow sheet   Rationale:      increase ROM and increase strength to improve the patients ability to complete ADLs and cooking activities.    15 min Manual Therapy: STM to B UT, levator scap, c/s paraspinals and suboccipital muscles in supine.    Rationale:      decrease pain, increase ROM and increase tissue extensibility to improve patient's ability to complete ADLs and cooking activities. Billed With/As:   [x] TE   [] TA   [] Neuro   [] Self Care Patient Education: [x] Review HEP    [] Progressed/Changed HEP based on:   [] positioning   [] body mechanics   [] transfers   [] heat/ice application    [] other:      Other Objective/Functional Measures:    (L)  strength 60#     Post Treatment Pain Level (on 0 to 10) scale:     / 10     ASSESSMENT  Assessment/Changes in Function:     Continues to report discomfort with sleeping.      []  See Progress Note/Recertification   Patient will continue to benefit from skilled PT services to modify and progress therapeutic interventions, address functional mobility deficits, address ROM deficits, address strength deficits, analyze and address soft tissue restrictions and analyze and cue movement patterns to attain remaining goals. Progress toward goals / Updated goals:    1. Pt to report +5 or > on GROC to improve functional mobility for cooking activities. 2. Pt to report 50% or > decrease in max pain levels to improve functional mobility for ADLs and cooking activities. 3. Pt to demonstrate 10 lb or > improvement in L  strength (since initial eval) to improve ability to carry/lift objects with L hand.  Achieved 15# improvement       PLAN  []  Upgrade activities as tolerated yes Continue plan of care   []  Discharge due to :    []  Other:      Therapist: Flavio Peace PTA    Date: 10/21/2019 Time: 6:15 AM     Future Appointments   Date Time Provider Shannan Lewis   10/21/2019  8:00 AM Vee Garrido, GENNY [facility]   10/25/2019  9:30 AM Vee Garrido, PTA [facility]   10/28/2019  9:00 AM Baxter Mustmathew, PTA [facility]   10/30/2019  3:00 PM Renetta Garrido, PT [facility]   11/4/2019  9:30 AM Baxter Mustmathew, PTA [facility]   11/8/2019  9:30 AM Vee Mustmathew, PTA [facility]   11/11/2019  9:30 AM Vee Garrido, PTA [facility]   11/13/2019 10:30 AM Renetta Garrido, PT [facility]   11/18/2019  9:30 AM Shawanda Navarrete, PTA [facility]

## 2019-10-25 ENCOUNTER — HOSPITAL ENCOUNTER (OUTPATIENT)
Dept: PHYSICAL THERAPY | Age: 69
Discharge: HOME | End: 2019-10-25
Payer: MEDICARE

## 2019-10-25 PROCEDURE — 97110 THERAPEUTIC EXERCISES: CPT

## 2019-10-25 PROCEDURE — 97140 MANUAL THERAPY 1/> REGIONS: CPT

## 2019-10-25 NOTE — PROGRESS NOTES
PHYSICAL THERAPY - DAILY TREATMENT NOTE    Patient Name: Camila Mann        Date: 10/25/2019  : 1950   yes Patient  Verified  Visit #:     Insurance: Payor: Lyric Knox / Plan: [place] MEDICARE PART B / Product Type: Medicare /      In time: 817 Out time: 1005   Total Treatment Time: 40     Medicare/[employer] Time Tracking (below)   Total Timed Codes (min):  40 1:1 Treatment Time:  40     TREATMENT AREA =  Neck pain [M54.2]    SUBJECTIVE  Pain Level (on 0 to 10 scale):  6  / 10   Medication Changes/New allergies or changes in medical history, any new surgeries or procedures?    no  If yes, update Summary List   Subjective Functional Status/Changes:  []  No changes reported     It's been bad all week with the pain and I haven't been able to sleep well at all. OBJECTIVE  25 min Therapeutic Exercise:  [x]  See flow sheet   Rationale:      increase ROM and increase strength to improve the patients ability to complete ADLs and cooking activities.        15 min Manual Therapy: STM to B UT, levator scap, c/s paraspinals and suboccipital muscles in supine.    Rationale:      decrease pain, increase ROM and increase tissue extensibility to improve patient's ability to complete ADLs and cooking activities.      Billed With/As:   [x] TE   [] TA   [] Neuro   [] Self Care Patient Education: [x] Review HEP    [] Progressed/Changed HEP based on:   [] positioning   [] body mechanics   [] transfers   [] heat/ice application    [] other:      Other Objective/Functional Measures:    Decrease pain reported along (L) UE after manual performed to UT and subscap release. Post Treatment Pain Level (on 0 to 10) scale:   0  / 10     ASSESSMENT  Assessment/Changes in Function:     Reports reports increase difficulty with sleeping secondary to increase of pain in shoulder. Patient has also had a difficult time with OH activities this week secondary to pain.   Patient has been progressing slowly with all therapeutic interventions. []  See Progress Note/Recertification   Patient will continue to benefit from skilled PT services to modify and progress therapeutic interventions, address ROM deficits, address strength deficits, analyze and address soft tissue restrictions and analyze and cue movement patterns to attain remaining goals. Progress toward goals / Updated goals:    1. Pt to report +5 or > on GROC to improve functional mobility for cooking activities. 2. Pt to report 50% or > decrease in max pain levels to improve functional mobility for ADLs and cooking activities. 3. Pt to demonstrate 10 lb or > improvement in L  strength (since initial eval) to improve ability to carry/lift objects with L hand.  Achieved 15# improvement       PLAN  []  Upgrade activities as tolerated yes Continue plan of care   []  Discharge due to :    []  Other:      Therapist: Ban Pedro PTA    Date: 10/25/2019 Time: 5:55 AM     Future Appointments   Date Time Provider Shannan Lewis   10/25/2019  9:30 AM Cindy Brewster PTA [facility]   10/28/2019  9:00 AM Cindy Brewster, PTA [facility]   10/30/2019  3:00 PM Zee Flores, PT [facility]   11/4/2019  9:30 AM Cindy Brewster PTA [facility]   11/8/2019  9:30 AM Cindy Brewster, PTA [facility]   11/11/2019  9:30 AM Cindy Brewster, PTA [facility]   11/13/2019 10:30 AM Zee Flores, PT [facility]   11/18/2019  9:30 AM Solomon Canales [place], [facility]

## 2019-10-28 ENCOUNTER — HOSPITAL ENCOUNTER (OUTPATIENT)
Dept: PHYSICAL THERAPY | Age: 69
Discharge: HOME | End: 2019-10-28
Payer: MEDICARE

## 2019-10-28 PROCEDURE — 97110 THERAPEUTIC EXERCISES: CPT

## 2019-10-28 PROCEDURE — 97140 MANUAL THERAPY 1/> REGIONS: CPT

## 2019-10-28 NOTE — PROGRESS NOTES
PHYSICAL THERAPY - DAILY TREATMENT NOTE    Patient Name: Delon Carbajal        Date: 10/28/2019  : 1950   yes Patient  Verified  Visit #:   15     Insurance: Payor: Henrique Reid / Plan: [place] MEDICARE PART B / Product Type: Medicare /      In time: 900 Out time: 979   Total Treatment Time: 35     Medicare/[facility] Time Tracking (below)   Total Timed Codes (min):  35 1:1 Treatment Time:  30     TREATMENT AREA =  Neck pain [M54.2]    SUBJECTIVE  Pain Level (on 0 to 10 scale):  6  / 10   Medication Changes/New allergies or changes in medical history, any new surgeries or procedures?    no  If yes, update Summary List   Subjective Functional Status/Changes:  []  No changes reported     My neck is really bothering me today. I really think it was the way I was sleeping. OBJECTIVE    20 min Therapeutic Exercise:  [x]  See flow sheet   Rationale:      increase ROM, increase strength, improve coordination and improve balance to improve the patients ability to complete ADLs and cooking activities.       15 min Manual Therapy: STM to B UT, levator scap, c/s paraspinals and suboccipital muscles in supine.    Rationale:      decrease pain, increase ROM and increase tissue extensibility to improve patient's ability to complete ADLs and cooking activities.       Billed With/As:   [x] TE   [] TA   [] Neuro   [] Self Care Patient Education: [x] Review HEP    [] Progressed/Changed HEP based on:   [] positioning   [] body mechanics   [] transfers   [] heat/ice application    [] other:      Other Objective/Functional Measures:    No significant restrictions noted with musculature today. Good PROM of C/s in all ranges. Post Treatment Pain Level (on 0 to 10) scale:   2  / 10     ASSESSMENT  Assessment/Changes in Function:     No noted changes in function reported today.      []  See Progress Note/Recertification   Patient will continue to benefit from skilled PT services to modify and progress therapeutic interventions, address ROM deficits, address strength deficits, analyze and address soft tissue restrictions and analyze and cue movement patterns to attain remaining goals. Progress toward goals / Updated goals:    1. Pt to report +5 or > on GROC to improve functional mobility for cooking activities. 2. Pt to report 50% or > decrease in max pain levels to improve functional mobility for ADLs and cooking activities.   3. Pt to demonstrate 10 lb or > improvement in L  strength (since initial eval) to improve ability to carry/lift objects with L hand. Achieved 15# improvement       PLAN  []  Upgrade activities as tolerated yes Continue plan of care   []  Discharge due to :    []  Other:      Therapist: Flavio Peace PTA    Date: 10/28/2019 Time: 6:20 AM     Future Appointments   Date Time Provider Shannan Lewis   10/28/2019  9:00 AM Vee Garrido PTA [facility]   10/30/2019  3:00 PM Renetta Garrido, PT [facility]   11/4/2019  9:30 AM Vee Garrido PTA [facility]   11/8/2019  9:30 AM Vee Garrido PTA [facility]   11/11/2019  9:30 AM Vee Garrido PTA [facility]   11/13/2019 10:30 AM Renetta Garrido PT [facility]   11/18/2019  9:30 AM Shawanda Navarrete PTA [facility]

## 2019-10-30 ENCOUNTER — APPOINTMENT (OUTPATIENT)
Dept: PHYSICAL THERAPY | Age: 69
End: 2019-10-30
Payer: MEDICARE

## 2019-11-01 ENCOUNTER — APPOINTMENT (OUTPATIENT)
Dept: PHYSICAL THERAPY | Age: 69
End: 2019-11-01
Payer: MEDICARE

## 2019-11-04 ENCOUNTER — HOSPITAL ENCOUNTER (OUTPATIENT)
Dept: PHYSICAL THERAPY | Age: 69
End: 2019-11-04
Payer: MEDICARE

## 2019-11-08 ENCOUNTER — HOSPITAL ENCOUNTER (OUTPATIENT)
Dept: PHYSICAL THERAPY | Age: 69
Discharge: HOME | End: 2019-11-08
Payer: MEDICARE

## 2019-11-08 PROCEDURE — 97110 THERAPEUTIC EXERCISES: CPT

## 2019-11-08 PROCEDURE — 97140 MANUAL THERAPY 1/> REGIONS: CPT

## 2019-11-08 NOTE — PROGRESS NOTES
PHYSICAL THERAPY - DAILY TREATMENT NOTE    Patient Name: Sharlee Ganser        Date: 2019  : 1950   yes Patient  Verified  Visit #:   15   of   16  Insurance: Payor: Tejas Fitzpatrick / Plan: [place] MEDICARE PART B / Product Type: Medicare /      In time: 930 Out time: 1000   Total Treatment Time: 30     Medicare/[employer] Time Tracking (below)   Total Timed Codes (min):  30 1:1 Treatment Time:  30     TREATMENT AREA =  Neck pain [M54.2]    SUBJECTIVE  Pain Level (on 0 to 10 scale):  4  / 10   Medication Changes/New allergies or changes in medical history, any new surgeries or procedures? yes  If yes, update Summary List   Subjective Functional Status/Changes:  []  No changes reported     I ahven't had as much arm pain because I haven't had to use it for hte last 4 days. Put since I have been I can feel the pain starting again. OBJECTIVE    20 min Therapeutic Exercise:  [x]  See flow sheet   Rationale:      increase ROM, increase strength, improve coordination and improve balance to improve the patients ability to complete ADLs and cooking activities.       10 min Manual Therapy: STM to B UT, levator scap, c/s paraspinals and suboccipital muscles in supine.    Rationale:      decrease pain, increase ROM and increase tissue extensibility to improve patient's ability to complete ADLs and cooking activities.       Billed With/As:   [x] TE   [] TA   [] Neuro   [] Self Care Patient Education: [x] Review HEP    [] Progressed/Changed HEP based on:   [] positioning   [] body mechanics   [] transfers   [] heat/ice application    [] other:      Other Objective/Functional Measures:    GROC 4 moderately better. Patient reported ~65% overall improvement with reduction of pain and performance of general ADLs.      Post Treatment Pain Level (on 0 to 10) scale:   4  / 10     ASSESSMENT  Assessment/Changes in Function:     See DC note     []  See Progress Note/Recertification   Patient will continue to benefit from skilled PT services to modify and progress therapeutic interventions, address ROM deficits, address strength deficits, analyze and address soft tissue restrictions and analyze and cue movement patterns to attain remaining goals. Progress toward goals / Updated goals:    1. Pt to report +5 or > on GROC to improve functional mobility for cooking activities. Progressing 11/8/19  2. Pt to report 50% or > decrease in max pain levels to improve functional mobility for ADLs and cooking activities. 65% improvement Achieved  3.  Pt to demonstrate 10 lb or > improvement in L  strength (since initial eval) to improve ability to carry/lift objects with L hand. Achieved 15# improvement       PLAN  []  Upgrade activities as tolerated yes Continue plan of care   []  Discharge due to :    []  Other:      Therapist: Catalina Larson PTA    Date: 11/8/2019 Time: 6:18 AM     Future Appointments   Date Time Provider Shannan Lewis   11/8/2019  9:30 AM Chao Ricardo PTA [facility]   11/11/2019  9:30 AM Chao Ricardo PTA [facility]   11/13/2019 10:30 AM Emilie Valencia, PT [facility]   11/18/2019  9:30 AM Kwame Hawkins, PTA [facility]

## 2019-11-11 ENCOUNTER — HOSPITAL ENCOUNTER (OUTPATIENT)
Dept: PHYSICAL THERAPY | Age: 69
End: 2019-11-11
Payer: MEDICARE

## 2019-11-11 NOTE — PROGRESS NOTES
[address] [place] [address] PHYSICAL THERAPY  [address] [place] [address] [address], Shar[address] G[address]elin [address],Marcy Schrader, [address] [facility] - Phone: [phone]  Fax: [phone] FOR PHYSICAL THERAPY          Patient Name: Justus Wilder : 1950   Treatment/Medical Diagnosis: Neck pain [M54.2]   Onset Date: 2019    Referral Source: MD Eloy Harrison MD Start of Care [facility]): 19   Prior Hospitalization: See Medical History Provider #: [id number]   Prior Level of Function: Complete ADLs, carry/lift objects, hold phone at eye level and complete cooking activities with increased efficiency   Comorbidities: Current L shoulder pain (possible RC tear), Latex allergy, C/s corpectomy/fusion C5-C7 , R elbow debridement , Heart Cath , Depression, Osteoporosis, Recent weight gain (MD aware), Asthma, Intermittent Dizziness   Medications: Verified on Patient Summary List   Visits from [facility]: 14 Missed Visits: 2     Goal/Measure of Progress Goal Met? 1.  Pt to report +5 or > on GROC to improve functional mobility for cooking activities. Status at last Eval: +4 moderately better. Current Status: +4 moderately better Progressing   2. Pt to report 50% or > decrease in max pain levels to improve functional mobility for ADLs and cooking activities. Status at last Eval: 10/10 - initial eval  9/10 - last assessment Current Status: See below Yes   3. Pt to demonstrate 10 lb or > improvement in L  strength (since initial eval) to improve ability to carry/lift objects with L hand. Status at last Eval: 45 lbs - initial eval  50 lbs - last assessment Current Status: 60 lbs yes     Key Functional Changes/Progress: Patient reports continued difficulty with sleeping secondary to increase of pain in shoulder. Patient has also had a difficult time with OH activities secondary to pain.  Good PROM of C/s in all ranges, but still c/o of pain with AROM and reproduction of L UE radicular symptoms. On average pain levels reported at 4/10 (0/10 on average after treatment sessions). Limited carryover of pain relief from one session to the next. Patient reported ~65% overall improvement with reduction of pain and performance of general ADLs. Patient reports that she was able to sit in a movie for a longer time with less symptoms. Pt's therapeutic ex continued to be limited due to MD request and current shoulder issues. Patient reported that she has a follow up appointment with her neurosurgeon to discuss further options.     Assessments/Recommendations: Discontinue therapy. Progressing towards or have reached established goals. Thank you for this referral.    If you have any questions/comments please contact us directly at [phone]. Thank you for allowing us to assist in the care of your patient.     Therapist Signature: JASMIN Ambriz/  Landen Manriquez DPT, ATC Date: 11/11/19   Reporting Period: 9/12/19 - 11/8/19 Time: 6:26 AM

## 2019-11-13 ENCOUNTER — APPOINTMENT (OUTPATIENT)
Dept: PHYSICAL THERAPY | Age: 69
End: 2019-11-13
Payer: MEDICARE

## 2019-11-18 ENCOUNTER — APPOINTMENT (OUTPATIENT)
Dept: PHYSICAL THERAPY | Age: 69
End: 2019-11-18
Payer: MEDICARE